# Patient Record
Sex: MALE | Race: WHITE | HISPANIC OR LATINO | URBAN - METROPOLITAN AREA
[De-identification: names, ages, dates, MRNs, and addresses within clinical notes are randomized per-mention and may not be internally consistent; named-entity substitution may affect disease eponyms.]

---

## 2022-05-16 RX ORDER — LEVETIRACETAM 500 MG/1
500 TABLET ORAL 3 TIMES DAILY
COMMUNITY
End: 2022-07-27 | Stop reason: SDUPTHER

## 2022-05-19 ENCOUNTER — OFFICE VISIT (OUTPATIENT)
Dept: NEUROLOGY | Facility: CLINIC | Age: 22
End: 2022-05-19

## 2022-05-19 VITALS
SYSTOLIC BLOOD PRESSURE: 122 MMHG | WEIGHT: 198 LBS | HEIGHT: 65 IN | DIASTOLIC BLOOD PRESSURE: 80 MMHG | BODY MASS INDEX: 32.99 KG/M2

## 2022-05-19 DIAGNOSIS — G40.219 PARTIAL SYMPTOMATIC EPILEPSY WITH COMPLEX PARTIAL SEIZURES, INTRACTABLE, WITHOUT STATUS EPILEPTICUS: Primary | ICD-10-CM

## 2022-05-19 PROBLEM — G40.919 INTRACTABLE EPILEPSY WITHOUT STATUS EPILEPTICUS: Status: ACTIVE | Noted: 2022-05-19

## 2022-05-19 PROCEDURE — 99212 OFFICE O/P EST SF 10 MIN: CPT | Mod: S$PBB,,, | Performed by: NURSE PRACTITIONER

## 2022-05-19 PROCEDURE — 99999 PR PBB SHADOW E&M-EST. PATIENT-LVL III: CPT | Mod: PBBFAC,,, | Performed by: NURSE PRACTITIONER

## 2022-05-19 PROCEDURE — 99213 OFFICE O/P EST LOW 20 MIN: CPT | Mod: PBBFAC | Performed by: NURSE PRACTITIONER

## 2022-05-19 PROCEDURE — 99212 PR OFFICE/OUTPT VISIT, EST, LEVL II, 10-19 MIN: ICD-10-PCS | Mod: S$PBB,,, | Performed by: NURSE PRACTITIONER

## 2022-05-19 PROCEDURE — 99999 PR PBB SHADOW E&M-EST. PATIENT-LVL III: ICD-10-PCS | Mod: PBBFAC,,, | Performed by: NURSE PRACTITIONER

## 2022-05-19 RX ORDER — ZONISAMIDE 100 MG/1
200 CAPSULE ORAL NIGHTLY
Qty: 60 CAPSULE | Refills: 11 | Status: SHIPPED | OUTPATIENT
Start: 2022-05-19 | End: 2022-06-27 | Stop reason: SDUPTHER

## 2022-05-19 NOTE — ASSESSMENT & PLAN NOTE
Continue the Keppra 1500 mg twice per day    Denies h/o kidney stone  Denies sulfa allergy     Start Zonisamide 100 mg at bed time for a week then increase to 200 mg [2 caps] at bed time thereafter    Medication administration personally reviewed with patient by MD/provider. Potential or actual medication changes discussed. Common and potentially serious side effects of medications or medication changes discussed.    Do not swim in pool alone  Do not bathe in tub full of water; shower preferred  Avoid medications such as Tramadol, Wellbutrin, Cipro, Levaquin  Avoid ladders/heights  Avoid binge drinking  Avoid sleep deprivation     Risks of recurrent seizure discussed. seizure precautions discussed. Pt aware that unlawful to drive in [place] until seizure free at least 6 months.    FU in 4-6 weeks

## 2022-05-19 NOTE — PROGRESS NOTES
"Subjective:       Patient ID: Colin Talavera is a 21 y.o. male.    Chief Complaint: Seizures     HPI:            Patient continues with seizure episodes; last one was 05/16/2022. Denies decrease in episodes since his last visit 1 month ago.    Starts with blank stare then falls to ground and has convulsions; bites tongue  Denies bowel or bladder incontinence    Episodes last 4-5 minutes; on awake, he is confused for "a while"    No prior EEG    Keppra 1500 mg BID      Review of Systems   Neurological: Positive for seizures.             Social History     Socioeconomic History    Marital status: Single   Tobacco Use    Smoking status: Never Smoker    Smokeless tobacco: Never Used   Substance and Sexual Activity    Alcohol use: Never    Drug use: Never         Current Outpatient Medications:     levETIRAcetam (KEPPRA) 500 MG Tab, Take 500 mg by mouth 3 (three) times daily., Disp: , Rfl:      Objective:        Exam:   /80 (BP Location: Right arm, Patient Position: Sitting, BP Method: Medium (Automatic))   Ht 5' 4.57" (1.64 m)   Wt 89.8 kg (197 lb 15.9 oz)   BMI 33.39 kg/m²     Physical Exam  Constitutional:       Appearance: Normal appearance.      Comments: Accompanied by: friend   HENT:      Head: Normocephalic.   Eyes:      General: Vision grossly intact.      Extraocular Movements: Extraocular movements intact.   Cardiovascular:      Rate and Rhythm: Normal rate and regular rhythm.   Pulmonary:      Effort: Pulmonary effort is normal.      Breath sounds: Normal breath sounds.   Neurological:      Mental Status: He is alert.      Comments: Speaks [place]    Psychiatric:         Attention and Perception: Attention normal.         Mood and Affect: Mood normal.         Speech: Speech normal.         Behavior: Behavior normal.         Cognition and Memory: Cognition normal.                   Assessment/Plan:       Problem List Items Addressed This Visit        Neuro    Intractable epilepsy without " status epilepticus - Primary    Current Assessment & Plan     Continue the Keppra 1500 mg twice per day    Denies h/o kidney stone  Denies sulfa allergy     Start Zonisamide 100 mg at bed time for a week then increase to 200 mg [2 caps] at bed time thereafter    Medication administration personally reviewed with patient by MD/provider. Potential or actual medication changes discussed. Common and potentially serious side effects of medications or medication changes discussed.    Do not swim in pool alone  Do not bathe in tub full of water; shower preferred  Avoid medications such as Tramadol, Wellbutrin, Cipro, Levaquin  Avoid ladders/heights  Avoid binge drinking  Avoid sleep deprivation     Risks of recurrent seizure discussed. seizure precautions discussed. Pt aware that unlawful to drive in [place] until seizure free at least 6 months.    FU in 4-6 weeks                             Miki Connor, MSN, APRN, AGACNP-BC

## 2022-06-27 ENCOUNTER — OFFICE VISIT (OUTPATIENT)
Dept: NEUROLOGY | Facility: CLINIC | Age: 22
End: 2022-06-27

## 2022-06-27 VITALS
WEIGHT: 197 LBS | DIASTOLIC BLOOD PRESSURE: 84 MMHG | HEIGHT: 64 IN | SYSTOLIC BLOOD PRESSURE: 130 MMHG | BODY MASS INDEX: 33.63 KG/M2 | HEART RATE: 54 BPM

## 2022-06-27 DIAGNOSIS — G40.219 PARTIAL SYMPTOMATIC EPILEPSY WITH COMPLEX PARTIAL SEIZURES, INTRACTABLE, WITHOUT STATUS EPILEPTICUS: Primary | ICD-10-CM

## 2022-06-27 PROCEDURE — 99999 PR PBB SHADOW E&M-EST. PATIENT-LVL III: CPT | Mod: PBBFAC,,, | Performed by: NURSE PRACTITIONER

## 2022-06-27 PROCEDURE — 99213 OFFICE O/P EST LOW 20 MIN: CPT | Mod: PBBFAC | Performed by: NURSE PRACTITIONER

## 2022-06-27 PROCEDURE — 99214 PR OFFICE/OUTPT VISIT, EST, LEVL IV, 30-39 MIN: ICD-10-PCS | Mod: S$PBB,,, | Performed by: NURSE PRACTITIONER

## 2022-06-27 PROCEDURE — 99214 OFFICE O/P EST MOD 30 MIN: CPT | Mod: S$PBB,,, | Performed by: NURSE PRACTITIONER

## 2022-06-27 PROCEDURE — 99999 PR PBB SHADOW E&M-EST. PATIENT-LVL III: ICD-10-PCS | Mod: PBBFAC,,, | Performed by: NURSE PRACTITIONER

## 2022-06-27 RX ORDER — ZONISAMIDE 100 MG/1
300 CAPSULE ORAL NIGHTLY
Qty: 90 CAPSULE | Refills: 11 | Status: SHIPPED | OUTPATIENT
Start: 2022-06-27

## 2022-06-27 NOTE — PROGRESS NOTES
Verified Zonisamide rx w [facility]: no titration directions (old titration instruction transferred to rx); verified pt is to inc dose from 200mg to 300mg

## 2022-06-27 NOTE — PROGRESS NOTES
"Subjective:          Patient ID: Colin Talavera is a 22 y.o. male.    Chief Complaint: Seizures     HPI:            Patient continues with seizure episodes; denies decrease since starting Zonisamide.    Last known seizure 06/24/2022; will have seizures x 2- 3 days, couple times per day followed by a stretch of 15 days with no seizures.     Denies driving at this time.    Denies sleep deprivation  Denies binge drinking  Denies dehydration    Keppra 1500 mg BID  Zonisamide 200 mg qhs    ROS: as per HPI, otherwise pertinent systems review is negative          Past Medical History:   Diagnosis Date    Seizures        History reviewed. No pertinent surgical history.    Family History   Problem Relation Age of Onset    Dementia Father        Social History     Socioeconomic History    Marital status: Single   Tobacco Use    Smoking status: Never Smoker    Smokeless tobacco: Never Used   Substance and Sexual Activity    Alcohol use: Never    Drug use: Never       Review of patient's allergies indicates:  No Known Allergies      Current Outpatient Medications:     levETIRAcetam (KEPPRA) 500 MG Tab, Take 500 mg by mouth 3 (three) times daily., Disp: , Rfl:     zonisamide (ZONEGRAN) 100 MG Cap, Take 3 capsules (300 mg total) by mouth nightly. 1 cap nightly for a week then increase to 2 caps nightly thereafter, Disp: 90 capsule, Rfl: 11         Objective:      Exam:   /84 (BP Location: Left arm, Patient Position: Sitting, BP Method: Medium (Automatic))   Pulse 54   Ht 5' 4" (1.626 m)   Wt 89.4 kg (197 lb)   BMI 33.81 kg/m²     Physical Exam  Vitals reviewed.     Constitutional:       Appearance: Normal appearance.      Comments: Accompanied by: friend   HENT:      Head: Normocephalic.   Eyes:      General: Vision grossly intact.      Extraocular Movements: Extraocular movements intact.   Cardiovascular:      Rate and Rhythm: Normal rate and regular rhythm.   Pulmonary:      Effort: Pulmonary effort is " normal.      Breath sounds: Normal breath sounds.   Neurological:      Mental Status: He is alert.      Comments: Speaks [place]    Psychiatric:         Attention and Perception: Attention normal.         Mood and Affect: Mood normal.         Speech: Speech normal.         Behavior: Behavior normal.         Cognition and Memory: Cognition normal.     Our MA Darius in the room as interpretor.         Assessment/Plan:     Problem List Items Addressed This Visit        Neuro    Intractable epilepsy without status epilepticus - Primary    Current Assessment & Plan     Increase Zonisamide to 300 mg at bed time; Medication administration personally reviewed with patient by MD/provider. Potential or actual medication changes discussed. Common and potentially serious side effects of medications or medication changes discussed.    Risks of recurrent seizure discussed. seizure precautions discussed. Pt aware that unlawful to drive in [place] until seizure free at least 6 months.    Do not swim in pool alone  Do not bathe in tub full of water; shower preferred  Avoid medications such as Tramadol, Wellbutrin, Cipro, Levaquin  Avoid ladders/heights  Avoid binge drinking  Avoid sleep deprivation       Routine EEG morning of next OV in 4 weeks    Please jasmin with Dr. Salomon for FU    FU 4 weeks                   Miki Connor, MSN, APRN, AGACNP-BC

## 2022-07-27 ENCOUNTER — PROCEDURE VISIT (OUTPATIENT)
Dept: SLEEP MEDICINE | Facility: HOSPITAL | Age: 22
End: 2022-07-27
Attending: NURSE PRACTITIONER

## 2022-07-27 ENCOUNTER — OFFICE VISIT (OUTPATIENT)
Dept: NEUROLOGY | Facility: CLINIC | Age: 22
End: 2022-07-27

## 2022-07-27 VITALS
BODY MASS INDEX: 33.63 KG/M2 | SYSTOLIC BLOOD PRESSURE: 144 MMHG | WEIGHT: 197 LBS | HEIGHT: 64 IN | DIASTOLIC BLOOD PRESSURE: 92 MMHG

## 2022-07-27 DIAGNOSIS — G40.219 PARTIAL SYMPTOMATIC EPILEPSY WITH COMPLEX PARTIAL SEIZURES, INTRACTABLE, WITHOUT STATUS EPILEPTICUS: ICD-10-CM

## 2022-07-27 DIAGNOSIS — G40.019 LOCALIZATION-RELATED (FOCAL) (PARTIAL) IDIOPATHIC EPILEPSY AND EPILEPTIC SYNDROMES WITH SEIZURES OF LOCALIZED ONSET, INTRACTABLE, WITHOUT STATUS EPILEPTICUS: Primary | ICD-10-CM

## 2022-07-27 PROCEDURE — 99215 PR OFFICE/OUTPT VISIT, EST, LEVL V, 40-54 MIN: ICD-10-PCS | Mod: S$PBB,,, | Performed by: SPECIALIST

## 2022-07-27 PROCEDURE — 99999 PR PBB SHADOW E&M-EST. PATIENT-LVL III: CPT | Mod: PBBFAC,,, | Performed by: SPECIALIST

## 2022-07-27 PROCEDURE — 99213 OFFICE O/P EST LOW 20 MIN: CPT | Mod: PBBFAC,25 | Performed by: SPECIALIST

## 2022-07-27 PROCEDURE — 99999 PR PBB SHADOW E&M-EST. PATIENT-LVL III: ICD-10-PCS | Mod: PBBFAC,,, | Performed by: SPECIALIST

## 2022-07-27 PROCEDURE — 99215 OFFICE O/P EST HI 40 MIN: CPT | Mod: S$PBB,,, | Performed by: SPECIALIST

## 2022-07-27 PROCEDURE — 95816 EEG AWAKE AND DROWSY: CPT | Mod: 26,,, | Performed by: SPECIALIST

## 2022-07-27 PROCEDURE — 95819 EEG AWAKE AND ASLEEP: CPT

## 2022-07-27 PROCEDURE — 95816 PR EEG,W/AWAKE & DROWSY RECORD: ICD-10-PCS | Mod: 26,,, | Performed by: SPECIALIST

## 2022-07-27 RX ORDER — LAMOTRIGINE 100 MG/1
100 TABLET ORAL 2 TIMES DAILY
Qty: 60 TABLET | Refills: 11 | Status: SHIPPED | OUTPATIENT
Start: 2022-07-27 | End: 2023-07-27

## 2022-07-27 RX ORDER — LEVETIRACETAM 500 MG/1
1000 TABLET ORAL EVERY 8 HOURS
Qty: 180 TABLET | Refills: 11 | Status: SHIPPED | OUTPATIENT
Start: 2022-07-27 | End: 2023-04-18

## 2022-07-27 NOTE — PROGRESS NOTES
"Subjective:         Patient ID: Colin Talavera is a 22 y.o. male.    Chief Complaint: sz fu     HPI:           Seizures (Pt states had 1 to 2 seizures since LOV. States whole body jerking movements with biting of tongue that lasts 5-10 minutes. Has been witnessed. Denies defecation or urination w seizures. Test results)    Stopped zonisamide bc he had body and back pain   Recognized carbamazepine as the tablet he'd tried in [place]   Did not recognize phenytoin     notes may also be on facesheet for HPI, ROS, and other sections   Review of Systems    ..          Social History     Socioeconomic History    Marital status: Single   Tobacco Use    Smoking status: Never Smoker    Smokeless tobacco: Never Used   Substance and Sexual Activity    Alcohol use: Never    Drug use: Never         Current Outpatient Medications:     levETIRAcetam (KEPPRA) 500 MG Tab, Take 500 mg by mouth 3 (three) times daily., Disp: , Rfl:   lamotrigine 25 mg am and 25 mg pm   Objective:      Exam  BP (!) 144/92 (BP Location: Left arm, Patient Position: Sitting)   Ht 5' 4" (1.626 m)   Wt 89.4 kg (197 lb)   BMI 33.81 kg/m²     General:   __unacccompanied       accompanied, by:_ brother    Heart__ RRR  pharynx:    Neurological  Speech:  Had to use  via iPad     cranial nerves:  vis fields: ok   EOMs: ok   Tongue: midline   funduscopic:  Motor: ok   coord: ok   Gait: ok     Imaging:   Images and imaging reports reviewed.  Study / studies:   Rads summary:  My comments:   MRI [facility] 2021 read as normal     EEG today with subtle L hemisphere sharp slowing and L greater than R frontal sharp waves     Labs:    meds:      __ multiple issues/ diagnoses or problems [if not enumerated in note then discussed in encounter but chose to or failed to document]    complexity of data   _._high _mod   __ images and reports reviewed:  __ hx obtained from family or accompaniment:   __other studies reviewed   __studies ordered __   __studies " discussed but not ordered __  __DDx discussed __    risks  _._high _mod   __ neurodegenerative condition expected to progress( possible or definite)   __ autoimmune condition with possibility of flares or unexpected attack( poss or def)    _._ seiz d.o. with possib of recurr seiz's (poss or def)      __ cerebrovasc ds with risk of recurrence of stroke  __ potentially high risk meds (and/or) CNS medications which may cause medical or behavioral side effects  __ fall risk  __ driving discussed   __ diagnosis unclear or DDx wide making risk uncertain to high  __other:    MDM/Medical Decision Making used for CPT choice based on above elements:  _._high _moderate           Assessment/Plan:       Problem List Items Addressed This Visit        Neuro    Localization-related (focal) (partial) idiopathic epilepsy and epileptic syndromes with seizures of localized onset, intractable, without status epilepticus - Primary           Other comments/ follow up:        Lamotrigine 25 mg TWO tabs twice daily until they run out;  Then fill new Rx for Lamotrigine 100mg and take one in am and one in pm   Along with the levetiracetam 500mg two tabs three times per day     If he visited w [facility] communication would be so much easier     No orders of the defined types were placed in this encounter.     Medications Ordered This Encounter   Medications    lamoTRIgine (LAMICTAL) 100 MG tablet     Sig: Take 1 tablet (100 mg total) by mouth 2 (two) times daily.     Dispense:  60 tablet     Refill:  11     This to start AFTER his 25mg lamotirigne run out (25mg two twice daily until he Runs out of 25mg's )       _._med/ Rx         modified      Aim follow up _2__ months _.__Dr. HARO     If we cannot get Connor to see him    ..    Dionicio Salomon MD JUSTICE

## 2022-07-27 NOTE — PROCEDURES
EEG    Date/Time: 2022 8:00 AM  Performed by: Dionicio Salomon MD  Authorized by: Miki Connor [facility]         EEG REPORT    PATIENT: Colin Talavera  : 2000    DATE: 22    INTERPRETING PHYSICIAN: Dionicio Salomon     Reason for EEG: intractable seizures      Duration of recordin   minutes 23 secs    This EEG is performed with the patient described as awake, drowsy, and asleep.     The resting posterior background activity consists of symmetrical background theta activity.    Focal epileptiform features:  Left temporal slowing at times and a few episodes of L greater than R sharp slowing, suggesting a left frontal or temporal seizure focus in my view this am.       Technical character: good      EKG: ok     IMPRESSION:  Features suggestive of left frontal or temporal seizure focus.      Dionicio Salomon MD JUSTICE      Current Outpatient Medications:     lamoTRIgine (LAMICTAL) 25mg twice daily     levETIRAcetam (KEPPRA) 500 MG Tab, Take 2 tablets (1,000 mg total) by mouth every 8 (eight) hours

## 2023-12-10 ENCOUNTER — HOSPITAL ENCOUNTER (EMERGENCY)
Facility: HOSPITAL | Age: 23
Discharge: HOME | End: 2023-12-10
Attending: STUDENT IN AN ORGANIZED HEALTH CARE EDUCATION/TRAINING PROGRAM
Payer: COMMERCIAL

## 2023-12-10 VITALS
TEMPERATURE: 99 F | SYSTOLIC BLOOD PRESSURE: 129 MMHG | RESPIRATION RATE: 11 BRPM | HEART RATE: 67 BPM | DIASTOLIC BLOOD PRESSURE: 75 MMHG | OXYGEN SATURATION: 95 %

## 2023-12-10 DIAGNOSIS — R56.9 SEIZURE: ICD-10-CM

## 2023-12-10 DIAGNOSIS — S02.2XXB OPEN FRACTURE OF NASAL BONE, INITIAL ENCOUNTER: Primary | ICD-10-CM

## 2023-12-10 DIAGNOSIS — M79.603 ARM PAIN: ICD-10-CM
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
## 2023-12-10 LAB
ALBUMIN SERPL-MCNC: 4.8 G/DL (ref 3.5–5)
ALBUMIN/GLOB SERPL: 1.2 RATIO (ref 1.1–2)
ALP SERPL-CCNC: 57 UNIT/L (ref 40–150)
ALT SERPL-CCNC: 34 UNIT/L (ref 0–55)
AMPHET UR QL SCN: NEGATIVE
APPEARANCE UR: CLEAR
AST SERPL-CCNC: 25 UNIT/L (ref 5–34)
BACTERIA #/AREA URNS AUTO: ABNORMAL /HPF
BARBITURATE SCN PRESENT UR: NEGATIVE
BASOPHILS # BLD AUTO: 0.07 X10(3)/MCL
BASOPHILS NFR BLD AUTO: 0.7 %
BENZODIAZ UR QL SCN: NEGATIVE
BILIRUB SERPL-MCNC: 0.5 MG/DL
BILIRUB UR QL STRIP.AUTO: NEGATIVE
BUN SERPL-MCNC: 8.4 MG/DL (ref 8.9–20.6)
CALCIUM SERPL-MCNC: 10.1 MG/DL (ref 8.4–10.2)
CANNABINOIDS UR QL SCN: POSITIVE
CHLORIDE SERPL-SCNC: 105 MMOL/L (ref 98–107)
CO2 SERPL-SCNC: 23 MMOL/L (ref 22–29)
COCAINE UR QL SCN: NEGATIVE
COLOR UR AUTO: ABNORMAL
CREAT SERPL-MCNC: 0.91 MG/DL (ref 0.73–1.18)
EOSINOPHIL # BLD AUTO: 0.02 X10(3)/MCL (ref 0–0.9)
EOSINOPHIL NFR BLD AUTO: 0.2 %
ERYTHROCYTE [DISTWIDTH] IN BLOOD BY AUTOMATED COUNT: 12.5 % (ref 11.5–17)
FENTANYL UR QL SCN: NEGATIVE
GFR SERPLBLD CREATININE-BSD FMLA CKD-EPI: >60 MLS/MIN/1.73/M2
GLOBULIN SER-MCNC: 4.1 GM/DL (ref 2.4–3.5)
GLUCOSE SERPL-MCNC: 99 MG/DL (ref 74–100)
GLUCOSE UR QL STRIP.AUTO: NORMAL
HCT VFR BLD AUTO: 46.4 % (ref 42–52)
HGB BLD-MCNC: 16.1 G/DL (ref 14–18)
IMM GRANULOCYTES # BLD AUTO: 0.06 X10(3)/MCL (ref 0–0.04)
IMM GRANULOCYTES NFR BLD AUTO: 0.6 %
KETONES UR QL STRIP.AUTO: NEGATIVE
LEUKOCYTE ESTERASE UR QL STRIP.AUTO: NEGATIVE
LYMPHOCYTES # BLD AUTO: 1.25 X10(3)/MCL (ref 0.6–4.6)
LYMPHOCYTES NFR BLD AUTO: 11.6 %
MCH RBC QN AUTO: 30.8 PG (ref 27–31)
MCHC RBC AUTO-ENTMCNC: 34.7 G/DL (ref 33–36)
MCV RBC AUTO: 88.9 FL (ref 80–94)
MDMA UR QL SCN: NEGATIVE
MONOCYTES # BLD AUTO: 0.47 X10(3)/MCL (ref 0.1–1.3)
MONOCYTES NFR BLD AUTO: 4.4 %
MUCOUS THREADS URNS QL MICRO: ABNORMAL /LPF
NEUTROPHILS # BLD AUTO: 8.86 X10(3)/MCL (ref 2.1–9.2)
NEUTROPHILS NFR BLD AUTO: 82.5 %
NITRITE UR QL STRIP.AUTO: NEGATIVE
NRBC BLD AUTO-RTO: 0 %
OPIATES UR QL SCN: NEGATIVE
PCP UR QL: NEGATIVE
PH UR STRIP.AUTO: 5.5 [PH]
PH UR: 5.5 [PH] (ref 3–11)
PLATELET # BLD AUTO: 301 X10(3)/MCL (ref 130–400)
PMV BLD AUTO: 10.1 FL (ref 7.4–10.4)
POTASSIUM SERPL-SCNC: 4.5 MMOL/L (ref 3.5–5.1)
PROT SERPL-MCNC: 8.9 GM/DL (ref 6.4–8.3)
PROT UR QL STRIP.AUTO: ABNORMAL
RBC # BLD AUTO: 5.22 X10(6)/MCL (ref 4.7–6.1)
RBC #/AREA URNS AUTO: ABNORMAL /HPF
RBC UR QL AUTO: NEGATIVE
SODIUM SERPL-SCNC: 139 MMOL/L (ref 136–145)
SP GR UR STRIP.AUTO: 1.02 (ref 1–1.03)
SPECIFIC GRAVITY, URINE AUTO (.000) (OHS): 1.02 (ref 1–1.03)
SQUAMOUS #/AREA URNS LPF: ABNORMAL /HPF
UROBILINOGEN UR STRIP-ACNC: NORMAL
WBC # SPEC AUTO: 10.73 X10(3)/MCL (ref 4.5–11.5)
WBC #/AREA URNS AUTO: ABNORMAL /HPF

## 2023-12-10 PROCEDURE — 90715 TDAP VACCINE 7 YRS/> IM: CPT

## 2023-12-10 PROCEDURE — 90471 IMMUNIZATION ADMIN: CPT

## 2023-12-10 PROCEDURE — 80307 DRUG TEST PRSMV CHEM ANLYZR: CPT

## 2023-12-10 PROCEDURE — 80053 COMPREHEN METABOLIC PANEL: CPT

## 2023-12-10 PROCEDURE — 12013 RPR F/E/E/N/L/M 2.6-5.0 CM: CPT

## 2023-12-10 PROCEDURE — 85025 COMPLETE CBC W/AUTO DIFF WBC: CPT

## 2023-12-10 PROCEDURE — 96367 TX/PROPH/DG ADDL SEQ IV INF: CPT

## 2023-12-10 PROCEDURE — 99285 EMERGENCY DEPT VISIT HI MDM: CPT | Mod: 25

## 2023-12-10 PROCEDURE — 81001 URINALYSIS AUTO W/SCOPE: CPT | Mod: XB

## 2023-12-10 PROCEDURE — 96365 THER/PROPH/DIAG IV INF INIT: CPT

## 2023-12-10 PROCEDURE — 63600175 PHARM REV CODE 636 W HCPCS

## 2023-12-10 RX ORDER — CYCLOBENZAPRINE HCL 10 MG
10 TABLET ORAL 3 TIMES DAILY PRN
Qty: 15 TABLET | Refills: 0 | Status: SHIPPED | OUTPATIENT
Start: 2023-12-10 | End: 2023-12-15

## 2023-12-10 RX ORDER — CEFAZOLIN SODIUM 2 G/50ML
2 SOLUTION INTRAVENOUS
Status: COMPLETED | OUTPATIENT
Start: 2023-12-10 | End: 2023-12-10

## 2023-12-10 RX ORDER — CEPHALEXIN 500 MG/1
500 CAPSULE ORAL EVERY 6 HOURS
Qty: 28 CAPSULE | Refills: 0 | Status: SHIPPED | OUTPATIENT
Start: 2023-12-10 | End: 2023-12-17

## 2023-12-10 RX ORDER — LEVETIRACETAM 10 MG/ML
1000 INJECTION INTRAVASCULAR
Status: COMPLETED | OUTPATIENT
Start: 2023-12-10 | End: 2023-12-10

## 2023-12-10 RX ORDER — MUPIROCIN 20 MG/G
OINTMENT TOPICAL 3 TIMES DAILY
Qty: 22 G | Refills: 0 | Status: SHIPPED | OUTPATIENT
Start: 2023-12-10

## 2023-12-10 RX ORDER — NAPROXEN 500 MG/1
500 TABLET ORAL 2 TIMES DAILY WITH MEALS
Qty: 20 TABLET | Refills: 0 | Status: SHIPPED | OUTPATIENT
Start: 2023-12-10

## 2023-12-10 RX ADMIN — LEVETIRACETAM INJECTION 1000 MG: 10 INJECTION INTRAVENOUS at 02:12

## 2023-12-10 RX ADMIN — TETANUS TOXOID, REDUCED DIPHTHERIA TOXOID AND ACELLULAR PERTUSSIS VACCINE, ADSORBED 0.5 ML: 5; 2.5; 8; 8; 2.5 SUSPENSION INTRAMUSCULAR at 02:12

## 2023-12-10 RX ADMIN — CEFAZOLIN SODIUM 2 G: 2 SOLUTION INTRAVENOUS at 02:12

## 2023-12-10 NOTE — DISCHARGE INSTRUCTIONS
¡Hilaria por dejarnos cuidar de usted alejandro! Nuestro objetivo es brindarle justen atención ken y mantenerlo cómodo e informado. Si tiene alguna pregunta antes de partir, estaré encantado de intentar responderla.    Aquí tienes algunos consejos después de tu visita:      [place] visita al departamento de emergencias NO es atención definitiva; realice un seguimiento con sloan médico de atención primaria y/o especialista dentro de 1 semana. Por favor regrese si sloan condición empeora o si tiene alguna otra inquietud.    Si se sometió a exámenes de radiología harjeet justen DWAYNE X o justen tomografía computarizada en el departamento de emergencias, la interpretación de ellos puede ser preliminar; es posible que haya resultados menos urgentes en los informes. Obtenga estos informes dentro de las 24 horas del hospital o utilizando sloan teléfono móvil. teléfono para acceder a los registros. Llévelos a sloan médico de atención primaria y/o especialista para justen revisión más detallada de los hallazgos incidentales.    Revise cualquier TRABAJO DE LABORATORIO de sloan visita de alejandro con sloan médico de atención primaria.    Si tuvo SUTURAS en el departamento de emergencias, hágalas retirar en el plazo prescrito, generalmente dentro de los 5 días. Puede ducharse, valarie no se bañe ni nade. Mantenga las heridas limpias y secas y cubiertas con un apósito limpio. Regrese si tiene algún signo de infección, harjeet enrojecimiento, drenaje o dolor en el sitio de la sutura.    [place] el ciclo completo de los ANTIBIÓTICOS que le recetaron; los ciclos incompletos de antibióticos pueden causar resistencia a los antibióticos en el futuro, lo que dificultará el tratamiento de cualquier infección que pueda tener
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
## 2023-12-15 ENCOUNTER — HOSPITAL ENCOUNTER (EMERGENCY)
Facility: HOSPITAL | Age: 23
Discharge: HOME | End: 2023-12-15
Attending: EMERGENCY MEDICINE

## 2023-12-15 VITALS
RESPIRATION RATE: 20 BRPM | SYSTOLIC BLOOD PRESSURE: 125 MMHG | OXYGEN SATURATION: 100 % | HEART RATE: 83 BPM | DIASTOLIC BLOOD PRESSURE: 87 MMHG | TEMPERATURE: 98 F

## 2023-12-15 DIAGNOSIS — Z48.02 VISIT FOR SUTURE REMOVAL: Primary | ICD-10-CM

## 2023-12-15 PROCEDURE — 99282 EMERGENCY DEPT VISIT SF MDM: CPT

## 2023-12-15 NOTE — ED PROVIDER NOTES
Encounter Date: 12/15/2023       History     Chief Complaint   Patient presents with    Suture / Staple Removal     Day 5 post suture placement. Edges approximated, no drainage. LYLA Coronel removing sutures in triage. Pt denies needs     See MDM    The history is provided by the patient. The history is limited by a language barrier. A  was used.     Review of patient's allergies indicates:  No Known Allergies  Past Medical History:   Diagnosis Date    Seizures      No past surgical history on file.  Family History   Problem Relation Age of Onset    Dementia Father      Social History     Tobacco Use    Smoking status: Never    Smokeless tobacco: Never   Substance Use Topics    Alcohol use: Never    Drug use: Never     Review of Systems   Skin:         Suture removal     All other systems reviewed and are negative.      Physical Exam     Initial Vitals [12/15/23 1631]   BP Pulse Resp Temp SpO2   125/87 83 20 98.1 °F (36.7 °C) 100 %      MAP       --         Physical Exam    Nursing note and vitals reviewed.  Constitutional: He appears well-developed and well-nourished.   Cardiovascular:  Normal rate.           Pulmonary/Chest: No respiratory distress.     Neurological: He is alert and oriented to person, place, and time.         ED Course   Suture Removal    Date/Time: 12/15/2023 5:23 PM  Location procedure was performed: [facility] EMERGENCY DEPARTMENT    Performed by: Berna Lauren FNP  Authorized by: Audelia Boyd MD  Body area: head/neck  Location details: nose  Wound Appearance: clean and well healed  Sutures Removed: 4  Post-removal: no dressing applied  Facility: sutures placed in this facility  Complications: No  Specimens: No  Implants: No  Patient tolerance: Patient tolerated the procedure well with no immediate complications        Labs Reviewed - No data to display       Imaging Results    None          Medications - No data to display  Medical Decision Making  24 y/o male who presents  with needing stitches out of his nose from 5 days ago that he had placed. No fever. No issues.     Sutures removed. Discharged. Healing well.       Additional MDM:   Differential Diagnosis:   Other: The following diagnoses were also considered and will be evaluated: suture removed, wound infection and cellulitis.                                   Clinical Impression:  Final diagnoses:  [Z48.02] Visit for suture removal (Primary)          ED Disposition Condition    Discharge Stable          ED Prescriptions    None       Follow-up Information       Follow up With Specialties Details Why Contact Info    Mina Reyes III, APRN-CNP Family Medicine Call in 1 week As needed [address] [facility] [address]  [phone]               Berna Lauren FNP  12/15/23 8482

## 2023-12-15 NOTE — DISCHARGE INSTRUCTIONS
Keep clean with mild soap and water. Triple antibiotic ointment. Take antibiotics until completion.

## 2023-12-30 ENCOUNTER — HOSPITAL ENCOUNTER (EMERGENCY)
Facility: HOSPITAL | Age: 23
Discharge: HOME | End: 2023-12-30
Attending: FAMILY MEDICINE

## 2023-12-30 VITALS
WEIGHT: 191.81 LBS | SYSTOLIC BLOOD PRESSURE: 130 MMHG | BODY MASS INDEX: 28.41 KG/M2 | RESPIRATION RATE: 16 BRPM | DIASTOLIC BLOOD PRESSURE: 85 MMHG | HEIGHT: 69 IN | HEART RATE: 81 BPM | TEMPERATURE: 99 F | OXYGEN SATURATION: 99 %

## 2023-12-30 DIAGNOSIS — G40.909 RECURRENT SEIZURES: Primary | ICD-10-CM

## 2023-12-30 DIAGNOSIS — S00.512A ABRASION OF TONGUE, INITIAL ENCOUNTER: ICD-10-CM

## 2023-12-30 LAB
ALBUMIN SERPL-MCNC: 4.1 G/DL (ref 3.5–5)
ALBUMIN/GLOB SERPL: 1.1 RATIO (ref 1.1–2)
ALP SERPL-CCNC: 47 UNIT/L (ref 40–150)
ALT SERPL-CCNC: 25 UNIT/L (ref 0–55)
APPEARANCE UR: CLEAR
AST SERPL-CCNC: 20 UNIT/L (ref 5–34)
BACTERIA #/AREA URNS AUTO: ABNORMAL /HPF
BASOPHILS # BLD AUTO: 0.04 X10(3)/MCL
BASOPHILS NFR BLD AUTO: 0.6 %
BILIRUB SERPL-MCNC: 0.5 MG/DL
BILIRUB UR QL STRIP.AUTO: NEGATIVE
BUN SERPL-MCNC: 9.3 MG/DL (ref 8.9–20.6)
CALCIUM SERPL-MCNC: 9.4 MG/DL (ref 8.4–10.2)
CHLORIDE SERPL-SCNC: 105 MMOL/L (ref 98–107)
CO2 SERPL-SCNC: 24 MMOL/L (ref 22–29)
COLOR UR AUTO: ABNORMAL
CREAT SERPL-MCNC: 0.84 MG/DL (ref 0.73–1.18)
EOSINOPHIL # BLD AUTO: 0.13 X10(3)/MCL (ref 0–0.9)
EOSINOPHIL NFR BLD AUTO: 1.9 %
ERYTHROCYTE [DISTWIDTH] IN BLOOD BY AUTOMATED COUNT: 12.4 % (ref 11.5–17)
GFR SERPLBLD CREATININE-BSD FMLA CKD-EPI: >60 MLS/MIN/1.73/M2
GLOBULIN SER-MCNC: 3.8 GM/DL (ref 2.4–3.5)
GLUCOSE SERPL-MCNC: 116 MG/DL (ref 74–100)
GLUCOSE UR QL STRIP.AUTO: NORMAL
HCT VFR BLD AUTO: 41.6 % (ref 42–52)
HGB BLD-MCNC: 14.1 G/DL (ref 14–18)
HYALINE CASTS #/AREA URNS LPF: ABNORMAL /LPF
IMM GRANULOCYTES # BLD AUTO: 0.01 X10(3)/MCL (ref 0–0.04)
IMM GRANULOCYTES NFR BLD AUTO: 0.1 %
KETONES UR QL STRIP.AUTO: ABNORMAL
LEUKOCYTE ESTERASE UR QL STRIP.AUTO: NEGATIVE
LYMPHOCYTES # BLD AUTO: 1.38 X10(3)/MCL (ref 0.6–4.6)
LYMPHOCYTES NFR BLD AUTO: 20.3 %
MCH RBC QN AUTO: 30.1 PG (ref 27–31)
MCHC RBC AUTO-ENTMCNC: 33.9 G/DL (ref 33–36)
MCV RBC AUTO: 88.7 FL (ref 80–94)
MONOCYTES # BLD AUTO: 0.5 X10(3)/MCL (ref 0.1–1.3)
MONOCYTES NFR BLD AUTO: 7.4 %
MUCOUS THREADS URNS QL MICRO: ABNORMAL /LPF
NEUTROPHILS # BLD AUTO: 4.73 X10(3)/MCL (ref 2.1–9.2)
NEUTROPHILS NFR BLD AUTO: 69.7 %
NITRITE UR QL STRIP.AUTO: NEGATIVE
NRBC BLD AUTO-RTO: 0 %
PH UR STRIP.AUTO: 6.5 [PH]
PLATELET # BLD AUTO: 329 X10(3)/MCL (ref 130–400)
PMV BLD AUTO: 9.9 FL (ref 7.4–10.4)
POCT GLUCOSE: 113 MG/DL (ref 70–110)
POTASSIUM SERPL-SCNC: 3.9 MMOL/L (ref 3.5–5.1)
PROT SERPL-MCNC: 7.9 GM/DL (ref 6.4–8.3)
PROT UR QL STRIP.AUTO: ABNORMAL
RBC # BLD AUTO: 4.69 X10(6)/MCL (ref 4.7–6.1)
RBC #/AREA URNS AUTO: ABNORMAL /HPF
RBC UR QL AUTO: NEGATIVE
SODIUM SERPL-SCNC: 140 MMOL/L (ref 136–145)
SP GR UR STRIP.AUTO: 1.03 (ref 1–1.03)
SQUAMOUS #/AREA URNS LPF: ABNORMAL /HPF
UNIDENT CRYS #/AREA URNS HPF: ABNORMAL /HPF
UROBILINOGEN UR STRIP-ACNC: NORMAL
WBC # SPEC AUTO: 6.79 X10(3)/MCL (ref 4.5–11.5)
WBC #/AREA URNS AUTO: ABNORMAL /HPF
YEAST BUDDING URNS QL: ABNORMAL /HPF

## 2023-12-30 PROCEDURE — 99284 EMERGENCY DEPT VISIT MOD MDM: CPT | Mod: 25

## 2023-12-30 PROCEDURE — 85025 COMPLETE CBC W/AUTO DIFF WBC: CPT | Performed by: FAMILY MEDICINE

## 2023-12-30 PROCEDURE — 80053 COMPREHEN METABOLIC PANEL: CPT | Performed by: FAMILY MEDICINE

## 2023-12-30 PROCEDURE — 80175 DRUG SCREEN QUAN LAMOTRIGINE: CPT | Performed by: FAMILY MEDICINE

## 2023-12-30 PROCEDURE — 96365 THER/PROPH/DIAG IV INF INIT: CPT

## 2023-12-30 PROCEDURE — 63600175 PHARM REV CODE 636 W HCPCS: Performed by: FAMILY MEDICINE

## 2023-12-30 PROCEDURE — 81015 MICROSCOPIC EXAM OF URINE: CPT | Performed by: FAMILY MEDICINE

## 2023-12-30 PROCEDURE — 96375 TX/PRO/DX INJ NEW DRUG ADDON: CPT

## 2023-12-30 RX ORDER — IBUPROFEN 600 MG/1
600 TABLET ORAL EVERY 6 HOURS PRN
Qty: 40 TABLET | Refills: 0 | Status: SHIPPED | OUTPATIENT
Start: 2023-12-30 | End: 2024-01-09

## 2023-12-30 RX ORDER — LEVETIRACETAM 5 MG/ML
500 INJECTION INTRAVASCULAR
Status: COMPLETED | OUTPATIENT
Start: 2023-12-30 | End: 2023-12-30

## 2023-12-30 RX ORDER — LORAZEPAM 2 MG/ML
1 INJECTION INTRAMUSCULAR
Status: COMPLETED | OUTPATIENT
Start: 2023-12-30 | End: 2023-12-30

## 2023-12-30 RX ORDER — CHLORHEXIDINE GLUCONATE ORAL RINSE 1.2 MG/ML
15 SOLUTION DENTAL 2 TIMES DAILY
Qty: 300 ML | Refills: 0 | Status: SHIPPED | OUTPATIENT
Start: 2023-12-30 | End: 2024-01-09

## 2023-12-30 RX ADMIN — LORAZEPAM 1 MG: 2 INJECTION INTRAMUSCULAR; INTRAVENOUS at 03:12

## 2023-12-30 RX ADMIN — LEVETIRACETAM INJECTION 500 MG: 5 INJECTION INTRAVENOUS at 03:12

## 2023-12-30 NOTE — ED PROVIDER NOTES
Encounter Date: 12/30/2023       History     Chief Complaint   Patient presents with    Seizures     Pt reports to the ed (with brother) secondary to seizure activity approximately 10 minutes ago. Relative also states patient has been without medication for 2 weeks but did take it today. Bite marks noted to tongue. Ggu=726 mg/dl. Alert and awake at this time.      Patient 23-year-old gentleman presents emergency room with complaints of recurrent seizure.  Patient reports that he has been out of his seizure medicine for the last 2 months, but recently got a 10 day prescription by his neurologist while awaiting on follow up appointment.  Patient currently reports biting his tongue, therefore came to the emergency room for evaluation of his tongue.    The history is provided by the patient. The history is limited by a language barrier. A  was used.     Review of patient's allergies indicates:  No Known Allergies  Past Medical History:   Diagnosis Date    Seizures      History reviewed. No pertinent surgical history.  Family History   Problem Relation Age of Onset    Dementia Father      Social History     Tobacco Use    Smoking status: Never    Smokeless tobacco: Never   Substance Use Topics    Alcohol use: Never    Drug use: Never     Review of Systems   Constitutional:  Negative for chills, fatigue and fever.   HENT:  Negative for ear pain, rhinorrhea and sore throat.    Eyes:  Negative for photophobia and pain.   Respiratory:  Negative for cough, shortness of breath and wheezing.    Cardiovascular:  Negative for chest pain.   Gastrointestinal:  Negative for abdominal pain, diarrhea, nausea and vomiting.   Genitourinary:  Negative for dysuria.   Neurological:  Negative for dizziness, weakness and headaches.   All other systems reviewed and are negative.      Physical Exam     Initial Vitals [12/30/23 0224]   BP Pulse Resp Temp SpO2   131/78 73 18 99.1 °F (37.3 °C) 99 %      MAP       --          Physical Exam    Nursing note and vitals reviewed.  Constitutional: He appears well-developed and well-nourished.   HENT:   Head: Normocephalic and atraumatic.   Patient has slight abrasion to the right lateral tongue consistent with biting his tongue; no gaping laceration appreciated   Eyes: EOM are normal. Pupils are equal, round, and reactive to light.   Neck: Neck supple.   Normal range of motion.  Cardiovascular:  Normal rate, regular rhythm and normal heart sounds.     Exam reveals no gallop and no friction rub.       No murmur heard.  Pulmonary/Chest: Breath sounds normal. No respiratory distress.   Abdominal: Abdomen is soft. Bowel sounds are normal. He exhibits no distension. There is no abdominal tenderness.   Musculoskeletal:         General: Normal range of motion.      Cervical back: Normal range of motion and neck supple.     Neurological: He is alert and oriented to person, place, and time. He has normal strength.   Skin: Skin is warm and dry.   Psychiatric: He has a normal mood and affect. His behavior is normal. Judgment and thought content normal.         ED Course   Procedures  Labs Reviewed   COMPREHENSIVE METABOLIC PANEL - Abnormal; Notable for the following components:       Result Value    Glucose Level 116 (*)     Globulin 3.8 (*)     All other components within normal limits   URINALYSIS, REFLEX TO URINE CULTURE - Abnormal; Notable for the following components:    Protein, UA Trace (*)     Ketones, UA 1+ (*)     Bacteria, UA Trace (*)     Budding Yeast, UA Occ (*)     Mucous, UA Trace (*)     Unclassified Crystal, UA Trace (*)     All other components within normal limits   CBC WITH DIFFERENTIAL - Abnormal; Notable for the following components:    RBC 4.69 (*)     Hct 41.6 (*)     All other components within normal limits   POCT GLUCOSE - Abnormal; Notable for the following components:    POCT Glucose 113 (*)     All other components within normal limits   CBC W/ AUTO DIFFERENTIAL     Narrative:     The following orders were created for panel order CBC Auto Differential.  Procedure                               Abnormality         Status                     ---------                               -----------         ------                     CBC with Differential[[id number]]       Abnormal            Final result                 Please view results for these tests on the individual orders.   LAMOTRIGINE LEVEL          Imaging Results    None          Medications   levETIRAcetam in NaCl (iso-os) IVPB 500 mg (0 mg Intravenous Stopped 12/30/23 0418)   LORazepam injection 1 mg (1 mg Intravenous Given 12/30/23 0328)     Medical Decision Making  Patient is a 23-year-old gentleman presents emergency room following biting his tongue from a recurrent seizure.  Currently reports taking Keppra and Lamictal as prescribed by his neurologist, currently awaiting on follow-up appointment.  Reports missing previous appointment, but is scheduled for an appointment on 1/5/24.      Amount and/or Complexity of Data Reviewed  Labs: ordered.    Risk  Prescription drug management.               ED Course as of 12/30/23 0457   Sat Dec 30, 2023   0431 WBC: 6.79 [MW]   0431 Hemoglobin: 14.1 [MW]   0431 Hematocrit(!): 41.6 [MW]   0431 Platelet Count: 329 [MW]   0453 Patient currently no acute distress.  Patient has received 500 mg of Keppra IV as well as 1 mg of Ativan.  Currently in no acute distress.  Stable for discharge to home.  ER precautions given for any acute worsening. [MW]      ED Course User Index  [MW] Amos Feng MD                           Clinical Impression:  Final diagnoses:  [G40.909] Recurrent seizures (Primary)  [S00.512A] Abrasion of tongue, initial encounter          ED Disposition Condition    Discharge Stable          ED Prescriptions       Medication Sig Dispense Start Date End Date Auth. Provider    chlorhexidine (PERIDEX) 0.12 % solution Use as directed 15 mLs in the mouth or throat  2 (two) times daily. for 10 days 300 mL 12/30/2023 1/9/2024 Amos Feng MD    ibuprofen (ADVIL,MOTRIN) 600 MG tablet Take 1 tablet (600 mg total) by mouth every 6 (six) hours as needed for Pain. 40 tablet 12/30/2023 1/9/2024 Amos Feng MD          Follow-up Information       Follow up With Specialties Details Why Contact Info    [facility] Emergency Medicine  As needed, If symptoms worsen [address] [facility] [address] [phone]    Primary Care Physician  In 5 days               Amos Feng MD  12/30/23 1660

## 2024-01-03 LAB — LAMOTRIGINE SERPL-MCNC: 5.2 MCG/ML (ref 3–15)
